# Patient Record
(demographics unavailable — no encounter records)

---

## 2025-01-03 NOTE — CONSULT LETTER
[Dear  ___] : Dear  [unfilled], [Consult Letter:] : I had the pleasure of evaluating your patient, [unfilled]. [( Thank you for referring [unfilled] for consultation for _____ )] : Thank you for referring [unfilled] for consultation for [unfilled] [Please see my note below.] : Please see my note below. [Consult Closing:] : Thank you very much for allowing me to participate in the care of this patient.  If you have any questions, please do not hesitate to contact me. [Sincerely,] : Sincerely, [FreeTextEntry2] : Kaden Cyr DO (ref/Pulm) [FreeTextEntry3] : Khadar Harper MD, MPH  System Director of Thoracic Surgery  Director of Comprehensive Lung and Foregut [place]  Professor Cardiovascular & Thoracic Surgery   [facility] [employer] at [facility]

## 2025-01-03 NOTE — HISTORY OF PRESENT ILLNESS
[FreeTextEntry1] : Mr. JULIA JACOME, 66 year old male, never smoker, w/ hx of prostate cancer s/p prostatectomy, CML, CAD s/p cardiac stent x 2 in 2010, HTN, HLD, ITP, ulcerative colitis, who has been following up with his oncologist at [facility] [place] then went to [facility] and cancer [place] for prostate Ca. Had PET/CT for evaluation of elevated PSA, incidentally found to have lung nodule. Had prior multiple scans in [facility] [place], reports/ imagings unavailable.  PET/CT on 9/8/23: - two subjacent pleural based RLL nodular densities associated with low-grade activity with superolateral one with central lucency/cavitation 1.3 cm, SUV=1.9 and inferomedial one 1.1 cm? SUV=1.4  PFTs on 10/4/23: %, GRH859%, DLCO 128%  MRI brain w w/o contrast on 10/23/2023: - 3 mm enhancing focus within the right external capsule which may be vascular in nature. However, given the patient's history continued surveillance recommended with early metastatic lesion not excluded.   Now 6 mo s/p Right VATS, RA, Wedge resection of the right lower lobe x2. Completion right lower lobectomy.  Mediastinal lymph node dissection.  Flexible bronchoscopy and aspiration of secretions on 11/06/2023. Path of RLL wedge resection revealed Adenocarcinoma, in lung, cribriform pattern, consistent with metastasis from prostatic adenocarcinoma.  Pleural invasion is seen. Tumor is 1.2 cm from the resection margin.  Multiple nodules are seen within the wedge, the largest measuring 1.5 cm. Immunohistochemistry is positive for NKX3.1 while negative for TTF-1, cytokeratin 7, and shows scattered cytokeratin 20 positive cells. Level 7, 4R, level 11, 2R are negative. Lung, Completion right lower lobectomy revealed Lung tissue, negative for carcinoma.  Margin is free of carcinoma. Lymph node negative for carcinoma.  Brain MRI on 2/16/24 at [facility]: - no acute intracranial findings - focus of enhancement questioned in the Rt subinsular white matter on previous exam is no longer identified  CT Chest on 5/10/24 at [facility]: - post-op changes - stable 3mm RUL (303: 107) and 3mm RUL (303: 76) nodules - small Rt-sided pleural effusion  CT Chest on .....  Pt presents today for follow up.

## 2025-01-03 NOTE — ASSESSMENT
[FreeTextEntry1] : Mr. JULIA JACOME, 66 year old male, never smoker, w/ hx of prostate cancer s/p prostatectomy, CML, CAD s/p cardiac stent x 2 in 2010, HTN, HLD, ITP, ulcerative colitis, who has been following up with [place] [facility] [place] then went to [facility] and cancer [place] for prostate Ca. Had PET/CT for evaluation of elevated PSA, incidentally found to have lung nodule. Had prior multiple scans in [facility] [place], reports/ imagings unavailable.  Now 6 mo s/p Right VATS, RA, Wedge resection of the right lower lobe x2. Completion right lower lobectomy.  Mediastinal lymph node dissection.  Flexible bronchoscopy and aspiration of secretions on 11/06/2023. Path of RLL wedge resection revealed Adenocarcinoma, in lung, cribriform pattern, consistent with metastasis from prostatic adenocarcinoma.  Pleural invasion is seen. Tumor is 1.2 cm from the resection margin.  Multiple nodules are seen within the wedge, the largest measuring 1.5 cm. Immunohistochemistry is positive for NKX3.1 while negative for TTF-1, cytokeratin 7, and shows scattered cytokeratin 20 positive cells. Level 7, 4R, level 11, 2R are negative. Lung, Completion right lower lobectomy revealed Lung tissue, negative for carcinoma.  Margin is free of carcinoma. Lymph node negative for carcinoma.  CT Chest on .....   I have reviewed the patient's medical records and diagnostic images at time of this office consultation and have made the following recommendation: 1.

## 2025-01-16 NOTE — PHYSICAL EXAM
[Respiration, Rhythm And Depth] : normal respiratory rhythm and effort [Exaggerated Use Of Accessory Muscles For Inspiration] : no accessory muscle use [Auscultation Breath Sounds / Voice Sounds] : lungs were clear to auscultation bilaterally [Apical Impulse] : the apical impulse was normal [Heart Rate And Rhythm] : heart rate was normal and rhythm regular [Heart Sounds] : normal S1 and S2 [Examination Of The Chest] : the chest was normal in appearance [No Pulse Delay] : no pulse delay [Bowel Sounds] : normal bowel sounds [Abdomen Soft] : soft [Abdomen Tenderness] : non-tender [No CVA Tenderness] : no ~M costovertebral angle tenderness [Involuntary Movements] : no involuntary movements were seen [Skin Color & Pigmentation] : normal skin color and pigmentation [Skin Turgor] : normal skin turgor [] : no rash [No Focal Deficits] : no focal deficits [Oriented To Time, Place, And Person] : oriented to person, place, and time [Affect] : the affect was normal [Mood] : the mood was normal

## 2025-01-16 NOTE — CONSULT LETTER
[FreeTextEntry2] : Kaden Cyr DO (ref/Pulm) [FreeTextEntry3] : Khadar Harper MD, MPH  System Director of Thoracic Surgery  Director of Comprehensive Lung and Foregut [place]  Professor Cardiovascular & Thoracic Surgery   [facility] [employer] at [facility]

## 2025-01-16 NOTE — ASSESSMENT
[FreeTextEntry1] : Mr. JULIA JACOME, 66 year old male, never smoker, w/ hx of prostate cancer s/p prostatectomy, CML, CAD s/p cardiac stent x 2 in 2010, HTN, HLD, ITP, ulcerative colitis, who has been following up with his oncologist at [facility] [place] then went to [facility] and cancer [place] for prostate Ca. Had PET/CT for evaluation of elevated PSA, incidentally found to have lung nodule. Had prior multiple scans in [facility] [place], reports/ imagings unavailable.  Now 6 mo s/p Right VATS, RA, Wedge resection of the right lower lobe x2. Completion right lower lobectomy.  Mediastinal lymph node dissection.  Flexible bronchoscopy and aspiration of secretions on 11/06/2023. Path of RLL wedge resection revealed Adenocarcinoma, in lung, cribriform pattern, consistent with metastasis from prostatic adenocarcinoma.  Pleural invasion is seen. Tumor is 1.2 cm from the resection margin.  Multiple nodules are seen within the wedge, the largest measuring 1.5 cm. Immunohistochemistry is positive for NKX3.1 while negative for TTF-1, cytokeratin 7, and shows scattered cytokeratin 20 positive cells. Level 7, 4R, level 11, 2R are negative. Lung, Completion right lower lobectomy revealed Lung tissue, negative for carcinoma.  Margin is free of carcinoma. Lymph node negative for carcinoma.  CT Chest on 1/3/25: - post-op changes - a ggo at Rt apex laterally 3 mm stable  - stable 3 mm nodule in RUL posterior medially - small loculated hydropneumothorax at Rt base posteriorly, the air component is new since last exam, the fluid component has decreased in size  - mild Rt sided pleural thickening    I have reviewed the patient's medical records and diagnostic images at time of this office consultation and have made the following recommendation: 1. CT scan 1/3/25 reviewed with pt today, stable findings.  2. RTC in 6 months with CT chest w/o contrast.     I, ALINA Davenport, personally performed the evaluation and management (E/M) services for this established patient who follow up today with an existing condition.  That E/M includes conducting the examination, assessing all new/exacerbated/existing conditions, and establishing a plan of care. Today, my ACP, Megan Kidd NP, was here to observe my evaluation and management services for this existing condition to be followed going forward.

## 2025-01-16 NOTE — ASSESSMENT
[FreeTextEntry1] : Mr. JULIA JACOME, 66 year old male, never smoker, w/ hx of prostate cancer s/p prostatectomy, CML, CAD s/p cardiac stent x 2 in 2010, HTN, HLD, ITP, ulcerative colitis, who [place] [facility] [place] [facility] and [place] for prostate Ca. Had PET/CT for evaluation of elevated PSA, incidentally found to have lung nodule. [place] [facility] [place], [place]/ imagings [place].  Now 6 mo s/p Right VATS, RA, Wedge resection of the right lower lobe x2. Completion right lower lobectomy.  Mediastinal lymph node dissection.  Flexible bronchoscopy and aspiration of secretions on 11/06/2023. Path of RLL wedge resection revealed Adenocarcinoma, in lung, cribriform pattern, consistent with metastasis from prostatic adenocarcinoma.  Pleural invasion is seen. Tumor is 1.2 cm from the resection margin.  Multiple nodules are seen within the wedge, the largest measuring 1.5 cm. Immunohistochemistry is positive for NKX3.1 while negative for TTF-1, cytokeratin 7, and shows scattered cytokeratin 20 positive cells. Level 7, 4R, level 11, 2R are negative. Lung, Completion right lower lobectomy revealed Lung tissue, negative for carcinoma.  Margin is free of carcinoma. Lymph node negative for carcinoma.  CT Chest on 1/3/25: - post-op changes - a ggo at Rt apex laterally 3 mm stable  - stable 3 mm nodule in RUL posterior medially - small loculated hydropneumothorax at Rt base posteriorly, the air component is new since last exam, the fluid component has decreased in size  - mild Rt sided pleural thickening    I have reviewed the patient's medical records and diagnostic images at time of this office consultation and have made the following recommendation: 1. CT scan 1/3/25 reviewed with pt today, stable findings.  2. RTC in 6 months with CT chest w/o contrast.     I, ALINA Davenport, personally performed the evaluation and management (E/M) services for this established patient who follow up today with an existing condition.  That E/M includes conducting the examination, assessing all new/exacerbated/existing conditions, and establishing a plan of care. Today, my ACP, Megan Kidd NP, was here to observe my evaluation and management services for this existing condition to be followed going forward.

## 2025-01-16 NOTE — HISTORY OF PRESENT ILLNESS
[FreeTextEntry1] : Mr. JULIA JACOME, 66 year old male, never smoker, w/ hx of prostate cancer s/p prostatectomy, CML, CAD s/p cardiac stent x 2 in 2010, HTN, HLD, ITP, ulcerative colitis, who has been following up with his [place]ologist [place] [facility] [place] then went to [facility] and cancer [place] for prostate Ca. Had PET/CT for evaluation of elevated PSA, incidentally found to have lung nodule. Had prior multiple scans in [facility] [place], reports/ imagings unavailable.  PET/CT on 9/8/23: - two subjacent pleural based RLL nodular densities associated with low-grade activity with superolateral one with central lucency/cavitation 1.3 cm, SUV=1.9 and inferomedial one 1.1 cm? SUV=1.4  PFTs on 10/4/23: %, [id number]%, DLCO 128%  MRI brain w w/o contrast on 10/23/2023: - 3 mm enhancing focus within the right external capsule which may be vascular in nature. However, given the patient's history continued surveillance recommended with early metastatic lesion not excluded.   Now 6 mo s/p Right VATS, RA, Wedge resection of the right lower lobe x2. Completion right lower lobectomy.  Mediastinal lymph node dissection.  Flexible bronchoscopy and aspiration of secretions on 11/06/2023. Path of RLL wedge resection revealed Adenocarcinoma, in lung, cribriform pattern, consistent with metastasis from prostatic adenocarcinoma.  Pleural invasion is seen. Tumor is 1.2 cm from the resection margin.  Multiple nodules are seen within the wedge, the largest measuring 1.5 cm. Immunohistochemistry is positive for NKX3.1 while negative for TTF-1, cytokeratin 7, and shows scattered cytokeratin 20 positive cells. Level 7, 4R, level 11, 2R are negative. Lung, Completion right lower lobectomy revealed Lung tissue, negative for carcinoma.  Margin is free of carcinoma. Lymph node negative for carcinoma.  Brain MRI on 2/16/24 at [facility]: - no acute intracranial findings - focus of enhancement questioned in the Rt subinsular white matter on previous exam is no longer identified  CT Chest on 5/10/24 at [facility]: - post-op changes - stable 3mm RUL (303: 107) and 3mm RUL (303: 76) nodules - small Rt-sided pleural effusion  CT Chest on 1/3/25: - post-op changes - a ggo at Rt apex laterally 3 mm stable  - stable 3 mm nodule in RUL posterior medially - small loculated hydropneumothorax at Rt base posteriorly, the air component is new since last exam, the fluid component has decreased in size  - mild Rt sided pleural thickening   Pt presents today for follow up. The patient denies SOB, cough, chest pain, hemoptysis, palpitation, fever, recent illness, hospitalization and significant weight loss.

## 2025-01-16 NOTE — PHYSICAL EXAM
[Respiration, Rhythm And Depth] : normal respiratory rhythm and effort [Exaggerated Use Of Accessory Muscles For Inspiration] : no accessory muscle use [Auscultation Breath Sounds / Voice Sounds] : lungs were clear to auscultation bilaterally [Apical Impulse] : the apical impulse was normal [Heart Rate And Rhythm] : heart rate was normal and rhythm regular [Heart Sounds] : normal S1 and S2 [Examination Of The Chest] : the chest was normal in appearance [No Pulse Delay] : no pulse delay [Abdomen Soft] : soft [Bowel Sounds] : normal bowel sounds [Abdomen Tenderness] : non-tender [No CVA Tenderness] : no ~M costovertebral angle tenderness [Involuntary Movements] : no involuntary movements were seen [Skin Color & Pigmentation] : normal skin color and pigmentation [Skin Turgor] : normal skin turgor [] : no rash [No Focal Deficits] : no focal deficits [Oriented To Time, Place, And Person] : oriented to person, place, and time [Affect] : the affect was normal [Mood] : the mood was normal

## 2025-01-16 NOTE — HISTORY OF PRESENT ILLNESS
[FreeTextEntry1] : Mr. JULIA JACOME, 66 year old male, never smoker, w/ hx of prostate cancer s/p prostatectomy, CML, CAD s/p cardiac stent x 2 in 2010, HTN, HLD, ITP, ulcerative colitis, who has been following up with his oncologist at [facility] [place] then went to [facility] and cancer [place] for prostate Ca. Had PET/CT for evaluation of elevated PSA, incidentally found to have lung nodule. Had prior multiple scans in [facility] [place], reports/ imagings unavailable.  PET/CT on 9/8/23: - two subjacent pleural based RLL nodular densities associated with low-grade activity with superolateral one with central lucency/cavitation 1.3 cm, SUV=1.9 and inferomedial one 1.1 cm? SUV=1.4  PFTs on 10/4/23: %, WUF124%, DLCO 128%  MRI brain w w/o contrast on 10/23/2023: - 3 mm enhancing focus within the right external capsule which may be vascular in nature. However, given the patient's history continued surveillance recommended with early metastatic lesion not excluded.   Now 6 mo s/p Right VATS, RA, Wedge resection of the right lower lobe x2. Completion right lower lobectomy.  Mediastinal lymph node dissection.  Flexible bronchoscopy and aspiration of secretions on 11/06/2023. Path of RLL wedge resection revealed Adenocarcinoma, in lung, cribriform pattern, consistent with metastasis from prostatic adenocarcinoma.  Pleural invasion is seen. Tumor is 1.2 cm from the resection margin.  Multiple nodules are seen within the wedge, the largest measuring 1.5 cm. Immunohistochemistry is positive for NKX3.1 while negative for TTF-1, cytokeratin 7, and shows scattered cytokeratin 20 positive cells. Level 7, 4R, level 11, 2R are negative. Lung, Completion right lower lobectomy revealed Lung tissue, negative for carcinoma.  Margin is free of carcinoma. Lymph node negative for carcinoma.  Brain MRI on 2/16/24 at [facility]: - no acute intracranial findings - focus of enhancement questioned in the Rt subinsular white matter on previous exam is no longer identified  CT Chest on 5/10/24 at [facility]: - post-op changes - stable 3mm RUL (303: 107) and 3mm RUL (303: 76) nodules - small Rt-sided pleural effusion  CT Chest on 1/3/25: - post-op changes - a ggo at Rt apex laterally 3 mm stable  - stable 3 mm nodule in RUL posterior medially - small loculated hydropneumothorax at Rt base posteriorly, the air component is new since last exam, the fluid component has decreased in size  - mild Rt sided pleural thickening   Pt presents today for follow up. The patient denies SOB, cough, chest pain, hemoptysis, palpitation, fever, recent illness, hospitalization and significant weight loss.

## 2025-01-16 NOTE — CONSULT LETTER
[FreeTextEntry2] : Kaden Cyr DO (ref/Pulm) [FreeTextEntry3] : Khadar Harper MD, MPH  System Director of Thoracic Surgery  Director of Comprehensive Lung and Foregut [place]  Professor Cardiovascular & Thoracic Surgery   [facility] [employer] at [facility]

## 2025-05-21 NOTE — HISTORY OF PRESENT ILLNESS
[de-identified] : 67 yr old male presents for skin check. Reports history of CML for which he is taking imatinib, and prostate cancer. No personal or family history of skin cancer. LV 3 years ago

## 2025-05-21 NOTE — PHYSICAL EXAM
[FreeTextEntry3] : 2 skin colored papules on second and 3rd digits of right hand  scaly macule on upper nasal bridge   few pustules on edge of annular scale extending from sole of foot to malleolus

## 2025-05-21 NOTE — ASSESSMENT
[FreeTextEntry1] :  # Bullous tinea pedis Chronic; flaring - Diagnosis and treatment options discussed - Start ketoconazole 2% cream BID, apply to feet, interwebs and under nails until symptoms resolve - Recommend over-the-counter antifungal spray or powder for your shoes 1-2 times a week. Alternatively, you may purchase UVC lamp online ([employer]) to get rid of fungus in your shoes - Keep feet dry, rotate shoes, cotton socks, clean shower/tub with bleach    #Verruca vulgaris x2, right hand - The patient was informed of the pathophysiology of their lesions and their treatment course with liquid nitrogen (cryosurgery). Side effects include blister formation, hypopigmentation, and scarring. Patient was verbally consented and the lesions identified above were treated with liquid nitrogen freeze, thaw, freeze x 10 seconds each cycle x 2. The patient tolerated the procedure well. Wound care instructions, care of a blister with vaseline, signs and symptoms of infection were discussed in full. The patient denies any questions at this time. - 1 week after treatment in the office, start home wart remover treatment as below: Apply 17% or 40% salicylic acid (Compound W, Wart Stick, DuoFilm, etc) to the affected area and let dry Then cover with duct tape, leave on for 24 hours. Then remove tape, wash area, then re-start. Take 1-3 days off if pain or severe irritation occurs. - Recommend HPV vaccination   #multiple nevi #Seborrheic keratoses 4. Lentigines 5. Cherry angioma Full body exam today. No concerning lesions for melanoma or NMSC clinically or under dermoscopy on todays exam. Benign findings as above. - Patient educated on ABCDEs of melanoma screening  - Recommend self skin exam monthly, annual exam by MD - Photoprotection reviewed including sun-protective behaviors, protective clothing, and the use of OTC broad-spectrum SPF 30+ sunscreens was advised  - RTC if develops lesions that are new, symptomatic (bleeding, itching), changing in size/color/shape  #Actinic Keratoses x1, nasal bridge - Natural history reviewed including small risk of transformation to squamous cell carcinoma over time  -The risks/benefits/alternatives of cryo-destruction was explained to the patient which, include but are not limited to redness, swelling, pain, blistering, scar, discoloration of skin, and recurrence. The patient expressed understanding of these risks and agreed to the procedure. 1 lesion treated with 2 cycles of LN2. The procedure was well tolerated, without complication. We have discussed related skin care.  # Xerosis Cutis Gentle skin care reviewed. Short baths/showers every other day, moisturize within three minutes of getting out of the shower, lukewarm water, liberal use of emollients at least 2-3X/day Cetaphil or Cerave cream BID to TID. Dove non scented soap in showers. -Provided hand out on dry skin care and recommended gentle moisturizing creams -Recommended limiting baths and showers to 5min and using warm not hot water, patting dry with towel without rubbing, and moisturizing immediately afterwards   RTC 2 months